# Patient Record
Sex: MALE | Race: WHITE
[De-identification: names, ages, dates, MRNs, and addresses within clinical notes are randomized per-mention and may not be internally consistent; named-entity substitution may affect disease eponyms.]

---

## 2018-09-04 ENCOUNTER — HOSPITAL ENCOUNTER (EMERGENCY)
Dept: HOSPITAL 74 - JER | Age: 48
Discharge: HOME | End: 2018-09-04
Payer: SELF-PAY

## 2018-09-04 VITALS — HEART RATE: 71 BPM | SYSTOLIC BLOOD PRESSURE: 144 MMHG | DIASTOLIC BLOOD PRESSURE: 98 MMHG | TEMPERATURE: 98 F

## 2018-09-04 VITALS — BODY MASS INDEX: 40.1 KG/M2

## 2018-09-04 DIAGNOSIS — I10: ICD-10-CM

## 2018-09-04 DIAGNOSIS — R51: Primary | ICD-10-CM

## 2018-09-04 LAB
ALBUMIN SERPL-MCNC: 4.7 G/DL (ref 3.4–5)
ALP SERPL-CCNC: 68 U/L (ref 45–117)
ALT SERPL-CCNC: 45 U/L (ref 12–78)
ANION GAP SERPL CALC-SCNC: 10 MMOL/L (ref 8–16)
AST SERPL-CCNC: 18 U/L (ref 15–37)
BASOPHILS # BLD: 0.6 % (ref 0–2)
BILIRUB SERPL-MCNC: 0.4 MG/DL (ref 0.2–1)
BUN SERPL-MCNC: 13 MG/DL (ref 7–18)
CALCIUM SERPL-MCNC: 9.6 MG/DL (ref 8.5–10.1)
CHLORIDE SERPL-SCNC: 104 MMOL/L (ref 98–107)
CO2 SERPL-SCNC: 27 MMOL/L (ref 21–32)
CREAT SERPL-MCNC: 0.8 MG/DL (ref 0.7–1.3)
DEPRECATED RDW RBC AUTO: 14.4 % (ref 11.9–15.9)
EOSINOPHIL # BLD: 0.7 % (ref 0–4.5)
GLUCOSE SERPL-MCNC: 81 MG/DL (ref 74–106)
HCT VFR BLD CALC: 42.7 % (ref 35.4–49)
HGB BLD-MCNC: 14.4 GM/DL (ref 11.7–16.9)
LYMPHOCYTES # BLD: 20 % (ref 8–40)
MCH RBC QN AUTO: 30.1 PG (ref 25.7–33.7)
MCHC RBC AUTO-ENTMCNC: 33.8 G/DL (ref 32–35.9)
MCV RBC: 89 FL (ref 80–96)
MONOCYTES # BLD AUTO: 6.3 % (ref 3.8–10.2)
NEUTROPHILS # BLD: 72.4 % (ref 42.8–82.8)
PLATELET # BLD AUTO: 295 K/MM3 (ref 134–434)
PMV BLD: 8.4 FL (ref 7.5–11.1)
POTASSIUM SERPLBLD-SCNC: 4.3 MMOL/L (ref 3.5–5.1)
PROT SERPL-MCNC: 7.9 G/DL (ref 6.4–8.2)
RBC # BLD AUTO: 4.8 M/MM3 (ref 4–5.6)
SODIUM SERPL-SCNC: 141 MMOL/L (ref 136–145)
WBC # BLD AUTO: 10.5 K/MM3 (ref 4–10)

## 2018-09-04 PROCEDURE — 3E033GC INTRODUCTION OF OTHER THERAPEUTIC SUBSTANCE INTO PERIPHERAL VEIN, PERCUTANEOUS APPROACH: ICD-10-PCS

## 2018-09-04 PROCEDURE — 3E033NZ INTRODUCTION OF ANALGESICS, HYPNOTICS, SEDATIVES INTO PERIPHERAL VEIN, PERCUTANEOUS APPROACH: ICD-10-PCS

## 2018-09-04 NOTE — PDOC
Attending Attestation





- Resident


Resident Name: Raphael Moreno





- ED Attending Attestation


I have performed the following: I have examined & evaluated the patient, The 

case was reviewed & discussed with the resident, I agree w/resident's findings 

& plan, Exceptions are as noted





- HPI


HPI: 





09/04/18 16:18


Mr Pierce is a 47 yo M who presents to the ER with a complaint of headache


He was present in the ER bedside to his friend


Patient states that he has had a headache for the past few days


He awoke with a headache several days ago


Described as tight, bandlike


(+) nausea no  vomiting


No head trauma





- Physicial Exam


PE: 





09/04/18 16:19


GENERAL: The patient is in no acute distress.


EYES: PERRLA, EOMI, sclera anicteric, conjunctiva clear.


NECK: Normal range of motion, supple, no nuchal rigidity


LUNGS: Breath sounds equal, clear to auscultation bilaterally.  No wheezes, and 

no crackles.


HEART:Regular rate and rhythm, normal S1 and S2 without murmur, rub or gallop.


ABDOMEN: Soft, nontender  


EXTREMITIES: Normal range of motion


NEUROLOGICAL: Cranial nerves II through XII grossly intact.  Normal speech.  No 

focal neurological deficits. 


MUSCULOSKELETAL:  Back non-tender to palpation, no CVA tenderness


SKIN: Warm, Dry, normal turgor, no rashes or lesions noted.





- Medical Decision Making





09/04/18 16:24


47 yo M presenting to the ER with a complaint of headache


He awoke with these symptoms


Pt in neurologically intact


will 





check labs


CT head


Tylenol, Reglan, Benadryl, IVF


Re Assess


Signed out to Dr Frazier

## 2018-09-04 NOTE — PDOC
History of Present Illness





- General


Chief Complaint: Blood Pressure Problem


Stated Complaint: HEADACHE, HIGH BP


Time Seen by Provider: 09/04/18 15:11





- History of Present Illness


Initial Comments: 





09/04/18 17:16


The patient is a 48 year old male with a history of HTN who presents for 

evaluation of headache.  The patient reports a 5 day history of a diffuse 

poorly described headache that has not responded to tylenol, aleive, exedrine 

prompting his presentation to the ED for further evaluation.  He noted that his 

blood pressure was a little elevated to day as well despite taking his home 

lisinopril.  He otherwise denies fevers, chills, SOB, chest pain, nausea, 

vomiting, abdominal pain, numbness, tingling, weakness, or changes with 

urination or bowel movements.





Past History





- Past Medical History


Allergies/Adverse Reactions: 


 Allergies











Allergy/AdvReac Type Severity Reaction Status Date / Time


 


No Known Allergies Allergy   Verified 09/04/18 13:13











Home Medications: 


Ambulatory Orders





Dextroamphetamine/Amphetamine [Adderall Xr 30 mg Capsule] 30 mg PO DAILY 09/04/ 18 


Lisinopril [Prinivil] 20 mg PO DAILY 09/04/18 


Vortioxetine Hydrobromide [Trintellix] 20 mg PO DAILY 09/04/18 








COPD: No





- Suicide/Smoking/Psychosocial Hx


Smoking History: Never smoked


Have you smoked in the past 12 months: No


Information on smoking cessation initiated: No


Hx Alcohol Use: No


Drug/Substance Use Hx: No


Substance Use Type: None





**Review of Systems





- Review of Systems


Comments:: 





09/04/18 20:12


Constitutional: No fevers, chills, fatigue, malaise


HEENT: No Rhinorrhea, nasal congestion, visual changes


Cardiovascular: No chest pain, syncope, palpitations, lightheadedness


Respiratory: No Cough, SOB, Hemoptysis,


Gastrointestinal: No Abdominal pain, Nausea, Vomiting, Constipation, Diarrhea, 

Melena


Genitourinary: No Dysuria, Frequency, Urgency, Hesitancy, Hematuria, Flank pain


Musculoskeletal: No Myalgia, arthralgia


Skin: No rashes, itching, bruising, pallor


Neurologic: Headache.  No Dizziness, Numbness, Weakness, or Tingling


Psychiatric: No Hallucinations. No SI or HI








*Physical Exam





- Vital Signs


 Last Vital Signs











Temp Pulse Resp BP Pulse Ox


 


 98.0 F   71   16   144/98   100 


 


 09/04/18 13:12  09/04/18 13:12  09/04/18 13:12  09/04/18 13:12  09/04/18 13:12














- Physical Exam


Comments: 





09/04/18 20:12


General Appearance: Nourished. No Apparent Distress


HEENT: EOMI, CARLOS. No Pharyngeal Erythema, Tonsillar Exudate, Tonsillar Erythema


Neck: No Cervical Lymphadenopathy


Respiratory/Chest: Lungs Clear, Normal Breath Sounds. No Crackles, Rales, 

Rhonchi, Wheezing


Cardiovascular: Regular Rhythm, Regular Rate. No Murmur, Gallops, Rubs


Gastrointestinal/Abdominal: Normal Bowel Sounds, Soft. No Guarding, Rebound, 

Tenderness


Musculoskeletal: No CVA Tenderness


Extremity: Normal Capillary Refill


Integumentary: Normal Color, Dry, Warm


Neurologic: CNs II-XII NML intact, Fully Oriented, Alert, Normal Mood/Affect, 

Normal Response, Motor Strength 5/5. Normal Finger to Nose and Heel to Meyers








ED Treatment Course





- LABORATORY


CBC & Chemistry Diagram: 


 09/04/18 15:30





 09/04/18 15:30





Medical Decision Making





- Medical Decision Making





09/04/18 20:13


The patient is a 48 year old male with a history of HTN who presents for 

evaluation of headache.  Differential includes but is not limited to: Infectious

, Intracranial process, migraine, metabolic derangement.  Given the patient's 

history and physical exam, we obtained a cbc, cmp, head ct.  CBC, cmp were 

unremarkable. Head CT is unremarkable as read by our radiologist.  We treated 

the patient with iv fluids, benadryl, reglan, iv tylenol and the patient 

reported mild improvement in his symptoms.  We are comfortable discharging the 

patient home with neurology follow up.  We discussed the results, plan, and 

return precautions with the patient who voiced understanding and is agreeable 

with the plan.





*DC/Admit/Observation/Transfer


Diagnosis at time of Disposition: 


Headache


Qualifiers:


 Headache type: unspecified Headache chronicity pattern: unspecified pattern 

Intractability: not intractable Qualified Code(s): R51 - Headache








- Discharge Dispostion


Disposition: HOME


Condition at time of disposition: Stable





- Referrals


Referrals: 


Halima Iyer MD [Primary Care Provider] - 


Jefry Franklin MD [Staff Physician] - 





- Patient Instructions


Printed Discharge Instructions:  DI for High Blood Pressure, DI for Headache


Additional Instructions: 


Please return to the ER if you experience concerning or worsening symptoms 

including any worsening headache or weakness.





Your lab results and head ct were normal here in the ER.  Please call to 

schedule a follow up appointment with our neurologist Dr. Franklin within 2-3 

days to discuss your ER visit and further management of your symptoms.





- Post Discharge Activity

## 2019-08-01 ENCOUNTER — HOSPITAL ENCOUNTER (EMERGENCY)
Dept: HOSPITAL 74 - JER | Age: 49
Discharge: HOME | End: 2019-08-01
Payer: COMMERCIAL

## 2019-08-01 VITALS — HEART RATE: 66 BPM | SYSTOLIC BLOOD PRESSURE: 145 MMHG | DIASTOLIC BLOOD PRESSURE: 92 MMHG

## 2019-08-01 VITALS — BODY MASS INDEX: 39.5 KG/M2

## 2019-08-01 VITALS — TEMPERATURE: 98.2 F

## 2019-08-01 DIAGNOSIS — I10: Primary | ICD-10-CM

## 2019-08-01 LAB
ALBUMIN SERPL-MCNC: 3.8 G/DL (ref 3.4–5)
ALP SERPL-CCNC: 56 U/L (ref 45–117)
ALT SERPL-CCNC: 47 U/L (ref 13–61)
ANION GAP SERPL CALC-SCNC: 6 MMOL/L (ref 8–16)
AST SERPL-CCNC: 36 U/L (ref 15–37)
BASOPHILS # BLD: 0.7 % (ref 0–2)
BILIRUB SERPL-MCNC: 0.3 MG/DL (ref 0.2–1)
BNP SERPL-MCNC: 80.7 PG/ML (ref 5–125)
BUN SERPL-MCNC: 11.2 MG/DL (ref 7–18)
CALCIUM SERPL-MCNC: 9.4 MG/DL (ref 8.5–10.1)
CHLORIDE SERPL-SCNC: 104 MMOL/L (ref 98–107)
CO2 SERPL-SCNC: 29 MMOL/L (ref 21–32)
CREAT SERPL-MCNC: 1 MG/DL (ref 0.55–1.3)
DEPRECATED RDW RBC AUTO: 14.6 % (ref 11.9–15.9)
EOSINOPHIL # BLD: 1.5 % (ref 0–4.5)
GLUCOSE SERPL-MCNC: 103 MG/DL (ref 74–106)
HCT VFR BLD CALC: 43.8 % (ref 35.4–49)
HGB BLD-MCNC: 15 GM/DL (ref 11.7–16.9)
INR BLD: 0.88 (ref 0.83–1.09)
LYMPHOCYTES # BLD: 22.1 % (ref 8–40)
MCH RBC QN AUTO: 30.9 PG (ref 25.7–33.7)
MCHC RBC AUTO-ENTMCNC: 34.2 G/DL (ref 32–35.9)
MCV RBC: 90.3 FL (ref 80–96)
MONOCYTES # BLD AUTO: 10 % (ref 3.8–10.2)
NEUTROPHILS # BLD: 65.7 % (ref 42.8–82.8)
PLATELET # BLD AUTO: 264 K/MM3 (ref 134–434)
PMV BLD: 7.9 FL (ref 7.5–11.1)
POTASSIUM SERPLBLD-SCNC: 4.5 MMOL/L (ref 3.5–5.1)
PROT SERPL-MCNC: 6.8 G/DL (ref 6.4–8.2)
PT PNL PPP: 10.4 SEC (ref 9.7–13)
RBC # BLD AUTO: 4.85 M/MM3 (ref 4–5.6)
SODIUM SERPL-SCNC: 139 MMOL/L (ref 136–145)
WBC # BLD AUTO: 10.2 K/MM3 (ref 4–10)

## 2019-08-01 PROCEDURE — 3E033GC INTRODUCTION OF OTHER THERAPEUTIC SUBSTANCE INTO PERIPHERAL VEIN, PERCUTANEOUS APPROACH: ICD-10-PCS

## 2019-08-01 PROCEDURE — 3E033NZ INTRODUCTION OF ANALGESICS, HYPNOTICS, SEDATIVES INTO PERIPHERAL VEIN, PERCUTANEOUS APPROACH: ICD-10-PCS

## 2019-08-01 NOTE — PDOC
Rapid Medical Evaluation


Time Seen by Provider: 08/01/19 18:23


Medical Evaluation: 


 Allergies











Allergy/AdvReac Type Severity Reaction Status Date / Time


 


No Known Allergies Allergy   Verified 09/04/18 13:13











08/01/19 18:23


I have performed a brief in-person evaluation of this patient.





The patient presents with a chief complaint of: 50 yo M w/ a h/o HTN sent by 

PMD for high blood pressure. Pt was having a headache, checked his BP which was 

high 183/125, he called his PMD who told him to come to the ED.


Pt took clonidine 0.2mg at around 6 pm.


Now c/o mild 2/10 HA on the top of his head. NO other complaints





Pertinent physical exam findings: Pt with anxious affect.





The patient will proceed to the ED for further evaluation.

## 2019-08-01 NOTE — PDOC
Documentation entered by Stevie Stark SCRIBE, acting as scribe for Tammi Nicole DO.








Tammi Nicole DO:  This documentation has been prepared by the Mi ya Xhesika, SCRIBE, under my direction and personally reviewed by me in its 

entirety.  I confirm that the documentation accurately reflects all work, 

treatment, procedures, and medical decision making performed by me.  





History of Present Illness





- General


Chief Complaint: Blood Pressure Problem


Stated Complaint: SENT BY DOCTOR


Time Seen by Provider: 08/01/19 18:23


History Source: Patient


Exam Limitations: No Limitations





- History of Present Illness


Initial Comments: 





08/01/19 19:36


The patient is a 49 year old male with a significant PMH of  HTN who presents 

to the emergency department  for HTN and headache. The patient states he 

endorsed a headache, checked his BP and it was 175/118 at 1pm and shortly after 

it was 185/125. Patient states he took 2 Clonidine at 6pm with no relief.  

Patient States he then called his PCP and was advised to come to the ED for 

further evaluation. Patient notes his BP at baseline is 140/90. Patient denies 

any changes in vision. Denies numbness or tingling in arms. 





The patient denies chest pain, shortness of breath, and dizziness. Denies fever

, chills, cough, nausea, vomiting, diarrhea and constipation. Denies dysuria, 

frequency, urgency and hematuria.





Allergies: Morphine 


PCP: Halima Lynn








Past History





- Past Medical History


Allergies/Adverse Reactions: 


 Allergies











Allergy/AdvReac Type Severity Reaction Status Date / Time


 


morphine AdvReac   Verified 08/01/19 18:23











Home Medications: 


Ambulatory Orders





Dextroamphetamine/Amphetamine [Adderall Xr 30 mg Capsule] 30 mg PO DAILY 09/04/ 18 


Lisinopril [Prinivil] 20 mg PO DAILY 09/04/18 


Vortioxetine Hydrobromide [Trintellix] 20 mg PO DAILY 09/04/18 








COPD: No


HTN: Yes





- Immunization History


Immunization Up to Date: Yes





- Suicide/Smoking/Psychosocial Hx


Smoking History: Never smoked


Have you smoked in the past 12 months: No


Hx Alcohol Use: No


Drug/Substance Use Hx: No


Substance Use Type: None





**Review of Systems





- Review of Systems


Able to Perform ROS?: Yes


Comments:: 





08/01/19 19:37





GENERAL/CONSTITUTIONAL: No fever or chills. No weakness.


HEAD, EYES, EARS, NOSE AND THROAT: No change in vision. No ear pain or 

discharge. No sore throat.


CARDIOVASCULAR: (+) HTN. No chest pain or shortness of breath.


RESPIRATORY: No cough, wheezing, or hemoptysis.


GASTROINTESTINAL: No nausea, vomiting, diarrhea or constipation.


GENITOURINARY: No dysuria, frequency, or change in urination.


MUSCULOSKELETAL: No joint or muscle swelling or pain. No neck or back pain.


SKIN: No rash


NEUROLOGIC: (+) headache. No vertigo, loss of consciousness, or change in 

strength/sensation.


ENDOCRINE:No increased thirst. No abnormal weight change.


HEMATOLOGIC/LYMPHATIC: No anemia, easy bleeding, or history of blood clots.


ALLERGIC/IMMUNOLOGIC: No hives or skin allergy.











*Physical Exam





- Vital Signs


 Last Vital Signs











Temp Pulse Resp BP Pulse Ox


 


 98.2 F   74   18   172/113 H  97 


 


 08/01/19 18:23  08/01/19 18:23  08/01/19 18:23  08/01/19 18:23  08/01/19 18:23














- Physical Exam


Comments: 





08/01/19 19:37





GENERAL: Awake, alert, and fully oriented, in no acute distress


HEAD: No signs of trauma


EYES: PERRLA, EOMI, sclera anicteric, conjunctiva clear


ENT: Auricles normal inspection, hearing grossly normal, nares patent, 

oropharynx clear without exudates. Moist mucosa


NECK: Normal ROM, supple, no lymphadenopathy, JVD, or masses


LUNGS: Breath sounds equal, clear to auscultation bilaterally.  No wheezes, and 

no crackles


HEART: Regular rate and rhythm, normal S1 and S2, no murmurs, rubs or gallops


ABDOMEN: Soft, nontender, normoactive bowel sounds.  No guarding, no rebound.  

No masses


EXTREMITIES: Normal range of motion, no edema.  No clubbing or cyanosis. No 

cords, erythema, or tenderness


NEUROLOGICAL: Cranial nerves II through XII grossly intact.  Normal speech, 

normal gait


SKIN: Warm, Dry, normal turgor, no rashes or lesions noted.





**Heart Score/ECG Review





- ECG Intrepretation


Comment:: 





08/01/19 19:40


sinus at 66, nl axis, nl interval, rsr' in v1, qrs 102, no acute st/t wave 

findings





ED Treatment Course





- LABORATORY


CBC & Chemistry Diagram: 


 08/01/19 19:45





 08/01/19 19:45





- RADIOLOGY


Radiology Studies Ordered: 














 Category Date Time Status


 


 HEAD CT WITHOUT CONTRAST [CT] Stat CT Scan  08/01/19 19:10 Ordered


 


 CHEST X-RAY PORTABLE* [RAD] Stat Radiology  08/01/19 19:11 Ordered














Medical Decision Making





- Medical Decision Making





08/01/19 19:41


a/p: 50yo male with hx of htn with elevated bp today


-has ha today but without neuro symptoms/findings


-no cp/sob


-no blurred vision


-no abd pain, no n/v/d


-no changes in diet, compliant with meds and took 2 extra clonidine


-will send labs, head ct, ekg, cxr, ua


-will monitor and reassess


-reglan and tylenol





08/01/19 21:23


head ct  neg


repeat bp improved


labs pending


08/01/19 21:36


trop neg


08/01/19 22:00


cxr clear


08/01/19 22:06


discussed labs and imaging with the patient


pt feeling better


bp improved


stable for dc to home and follow up with PMD. 





*DC/Admit/Observation/Transfer


Diagnosis at time of Disposition: 


 Headache, Uncontrolled hypertension








- Discharge Dispostion


Disposition: HOME


Condition at time of disposition: Stable


Decision to Admit order: No





- Referrals


Referrals: 


Halima Iyer MD [Primary Care Provider] - 





- Patient Instructions


Printed Discharge Instructions:  DI for High Blood Pressure, How to Monitor 

Your Blood Pressure at Home


Additional Instructions: 


Please continue to take all medications as prescribed. Please return to the ED 

with any further concerns or complaints. Please continue to take all blood 

pressure medications as prescribed. Please call your PMD for a repeat blood 

pressure check. Please return to the ED with any further concerns or 

complaints. 





- Post Discharge Activity





- Attestations


Physician Attestion: 





08/01/19 22:10








I, Dr. Tammi Nicole, DO, attest that this document has been prepared under 

my direction and personally reviewed by me in its entirety.   I further attest, 

that it accurately reflects all work, treatment, procedures and medical decision

-making performed by me.

## 2019-08-02 NOTE — EKG
Test Reason : 

Blood Pressure : ***/*** mmHG

Vent. Rate : 066 BPM     Atrial Rate : 066 BPM

   P-R Int : 176 ms          QRS Dur : 102 ms

    QT Int : 386 ms       P-R-T Axes : 023 028 061 degrees

   QTc Int : 404 ms

 

NORMAL SINUS RHYTHM

RSR' OR QR PATTERN IN V1 SUGGESTS RIGHT VENTRICULAR CONDUCTION DELAY

NO PREVIOUS ECGS AVAILABLE

Confirmed by QUINN BRUMFIELD MD ([id number]) on 8/2/2019 2:11:17 PM

 

Referred By:             Confirmed By:QUINN BRUMFIELD MD

## 2020-12-13 ENCOUNTER — HOSPITAL ENCOUNTER (EMERGENCY)
Dept: HOSPITAL 74 - JER | Age: 50
Discharge: HOME | End: 2020-12-13
Payer: COMMERCIAL

## 2020-12-13 VITALS — HEART RATE: 83 BPM | SYSTOLIC BLOOD PRESSURE: 152 MMHG | DIASTOLIC BLOOD PRESSURE: 109 MMHG

## 2020-12-13 VITALS — TEMPERATURE: 98 F

## 2020-12-13 VITALS — BODY MASS INDEX: 33.1 KG/M2

## 2020-12-13 DIAGNOSIS — I10: Primary | ICD-10-CM

## 2020-12-13 LAB
ALBUMIN SERPL-MCNC: 4 G/DL (ref 3.4–5)
ALP SERPL-CCNC: 61 U/L (ref 45–117)
ALT SERPL-CCNC: 39 U/L (ref 13–61)
ANION GAP SERPL CALC-SCNC: 7 MMOL/L (ref 8–16)
APPEARANCE UR: CLEAR
AST SERPL-CCNC: 27 U/L (ref 15–37)
BACTERIA # UR AUTO: 10 /UL (ref 0–1359)
BASOPHILS # BLD: 0.5 % (ref 0–2)
BILIRUB SERPL-MCNC: 0.3 MG/DL (ref 0.2–1)
BILIRUB UR STRIP.AUTO-MCNC: NEGATIVE MG/DL
BUN SERPL-MCNC: 14.2 MG/DL (ref 7–18)
CALCIUM SERPL-MCNC: 9.5 MG/DL (ref 8.5–10.1)
CASTS URNS QL MICRO: 0 /UL (ref 0–3.1)
CHLORIDE SERPL-SCNC: 104 MMOL/L (ref 98–107)
CO2 SERPL-SCNC: 31 MMOL/L (ref 21–32)
COLOR UR: YELLOW
CREAT SERPL-MCNC: 1.2 MG/DL (ref 0.55–1.3)
DEPRECATED RDW RBC AUTO: 13.8 % (ref 11.9–15.9)
EOSINOPHIL # BLD: 0.9 % (ref 0–4.5)
EPITH CASTS URNS QL MICRO: 3 /UL (ref 0–25.1)
GLUCOSE SERPL-MCNC: 88 MG/DL (ref 74–106)
HCT VFR BLD CALC: 40.9 % (ref 35.4–49)
HGB BLD-MCNC: 13.7 GM/DL (ref 11.7–16.9)
KETONES UR QL STRIP: NEGATIVE
LEUKOCYTE ESTERASE UR QL STRIP.AUTO: NEGATIVE
LYMPHOCYTES # BLD: 11.4 % (ref 8–40)
MCH RBC QN AUTO: 30.7 PG (ref 25.7–33.7)
MCHC RBC AUTO-ENTMCNC: 33.4 G/DL (ref 32–35.9)
MCV RBC: 91.8 FL (ref 80–96)
MONOCYTES # BLD AUTO: 7.3 % (ref 3.8–10.2)
NEUTROPHILS # BLD: 79.9 % (ref 42.8–82.8)
NITRITE UR QL STRIP: NEGATIVE
PH UR: 6 [PH] (ref 5–8)
PLATELET # BLD AUTO: 250 K/MM3 (ref 134–434)
PMV BLD: 7.5 FL (ref 7.5–11.1)
POTASSIUM SERPLBLD-SCNC: 4.6 MMOL/L (ref 3.5–5.1)
PROT SERPL-MCNC: 6.9 G/DL (ref 6.4–8.2)
PROT UR QL STRIP: NEGATIVE
PROT UR QL STRIP: NEGATIVE
RBC # BLD AUTO: 4.45 M/MM3 (ref 4–5.6)
RBC # BLD AUTO: 51 /UL (ref 0–23.9)
SODIUM SERPL-SCNC: 141 MMOL/L (ref 136–145)
SP GR UR: 1.01 (ref 1.01–1.03)
UROBILINOGEN UR STRIP-MCNC: 0.2 MG/DL (ref 0.2–1)
WBC # BLD AUTO: 7 K/MM3 (ref 4–10)
WBC # UR AUTO: 6 /UL (ref 0–25.8)

## 2020-12-13 PROCEDURE — 3E0333Z INTRODUCTION OF ANTI-INFLAMMATORY INTO PERIPHERAL VEIN, PERCUTANEOUS APPROACH: ICD-10-PCS

## 2020-12-13 PROCEDURE — 3E033GC INTRODUCTION OF OTHER THERAPEUTIC SUBSTANCE INTO PERIPHERAL VEIN, PERCUTANEOUS APPROACH: ICD-10-PCS

## 2021-09-21 ENCOUNTER — HOSPITAL ENCOUNTER (INPATIENT)
Dept: HOSPITAL 74 - JER | Age: 51
LOS: 2 days | Discharge: HOME | DRG: 683 | End: 2021-09-23
Attending: INTERNAL MEDICINE | Admitting: INTERNAL MEDICINE
Payer: COMMERCIAL

## 2021-09-21 VITALS — BODY MASS INDEX: 41.2 KG/M2

## 2021-09-21 DIAGNOSIS — R11.0: ICD-10-CM

## 2021-09-21 DIAGNOSIS — M62.81: ICD-10-CM

## 2021-09-21 DIAGNOSIS — F41.9: ICD-10-CM

## 2021-09-21 DIAGNOSIS — E66.01: ICD-10-CM

## 2021-09-21 DIAGNOSIS — K21.9: ICD-10-CM

## 2021-09-21 DIAGNOSIS — D72.829: ICD-10-CM

## 2021-09-21 DIAGNOSIS — G25.1: ICD-10-CM

## 2021-09-21 DIAGNOSIS — I10: ICD-10-CM

## 2021-09-21 DIAGNOSIS — N17.9: Primary | ICD-10-CM

## 2021-09-21 LAB
ALBUMIN SERPL-MCNC: 4.7 G/DL (ref 3.4–5)
ALP SERPL-CCNC: 83 U/L (ref 45–117)
ALT SERPL-CCNC: 45 U/L (ref 13–61)
ANION GAP SERPL CALC-SCNC: 6 MMOL/L (ref 8–16)
APPEARANCE UR: CLEAR
APTT BLD: 31.5 SECONDS (ref 25.2–36.5)
AST SERPL-CCNC: 29 U/L (ref 15–37)
BASE EXCESS BLDV CALC-SCNC: -4.8 MMOL/L (ref -2–2)
BASOPHILS # BLD: 0.4 % (ref 0–2)
BILIRUB SERPL-MCNC: 0.4 MG/DL (ref 0.2–1)
BILIRUB UR STRIP.AUTO-MCNC: NEGATIVE MG/DL
BUN SERPL-MCNC: 28.6 MG/DL (ref 7–18)
CALCIUM SERPL-MCNC: 9.7 MG/DL (ref 8.5–10.1)
CHLORIDE SERPL-SCNC: 105 MMOL/L (ref 98–107)
CO2 SERPL-SCNC: 25 MMOL/L (ref 21–32)
COLOR UR: YELLOW
CREAT SERPL-MCNC: 1.8 MG/DL (ref 0.55–1.3)
DEPRECATED RDW RBC AUTO: 14.3 % (ref 11.9–15.9)
EOSINOPHIL # BLD: 0.4 % (ref 0–4.5)
GLUCOSE SERPL-MCNC: 110 MG/DL (ref 74–106)
HCT VFR BLD CALC: 35 % (ref 35.4–49)
HCT VFR BLDV CALC: 38 % (ref 35.4–49)
HGB BLD-MCNC: 12 GM/DL (ref 11.7–16.9)
INR BLD: 0.9 (ref 0.83–1.09)
KETONES UR QL STRIP: NEGATIVE
LEUKOCYTE ESTERASE UR QL STRIP.AUTO: NEGATIVE
LIPASE SERPL-CCNC: 149 U/L (ref 73–393)
LYMPHOCYTES # BLD: 7.7 % (ref 8–40)
MAGNESIUM SERPL-MCNC: 2.1 MG/DL (ref 1.8–2.4)
MCH RBC QN AUTO: 31.9 PG (ref 25.7–33.7)
MCHC RBC AUTO-ENTMCNC: 34.3 G/DL (ref 32–35.9)
MCV RBC: 92.8 FL (ref 80–96)
MONOCYTES # BLD AUTO: 6.3 % (ref 3.8–10.2)
NEUTROPHILS # BLD: 85.2 % (ref 42.8–82.8)
NITRITE UR QL STRIP: NEGATIVE
PCO2 BLDV: 51.4 MMHG (ref 38–52)
PH BLDV: 7.26 [PH] (ref 7.31–7.41)
PH UR: 5 [PH] (ref 5–8)
PLATELET # BLD AUTO: 272 10^3/UL (ref 134–434)
PMV BLD: 7.3 FL (ref 7.5–11.1)
PROT SERPL-MCNC: 7.9 G/DL (ref 6.4–8.2)
PROT UR QL STRIP: (no result)
PROT UR QL STRIP: NEGATIVE
PT PNL PPP: 11 SEC (ref 9.7–13)
RBC # BLD AUTO: 3.77 M/MM3 (ref 4–5.6)
SAO2 % BLDV: 55.9 % (ref 70–80)
SODIUM SERPL-SCNC: 136 MMOL/L (ref 136–145)
SP GR UR: 1.01 (ref 1.01–1.03)
UROBILINOGEN UR STRIP-MCNC: 0.2 MG/DL (ref 0.2–1)
WBC # BLD AUTO: 12.2 K/MM3 (ref 4–10)

## 2021-09-21 PROCEDURE — U0003 INFECTIOUS AGENT DETECTION BY NUCLEIC ACID (DNA OR RNA); SEVERE ACUTE RESPIRATORY SYNDROME CORONAVIRUS 2 (SARS-COV-2) (CORONAVIRUS DISEASE [COVID-19]), AMPLIFIED PROBE TECHNIQUE, MAKING USE OF HIGH THROUGHPUT TECHNOLOGIES AS DESCRIBED BY CMS-2020-01-R: HCPCS

## 2021-09-21 PROCEDURE — C9803 HOPD COVID-19 SPEC COLLECT: HCPCS

## 2021-09-21 PROCEDURE — U0005 INFEC AGEN DETEC AMPLI PROBE: HCPCS

## 2021-09-22 LAB
ANION GAP SERPL CALC-SCNC: 6 MMOL/L (ref 8–16)
BASOPHILS # BLD: 0.2 % (ref 0–2)
BUN SERPL-MCNC: 22.3 MG/DL (ref 7–18)
CALCIUM SERPL-MCNC: 10 MG/DL (ref 8.5–10.1)
CHLORIDE SERPL-SCNC: 107 MMOL/L (ref 98–107)
CO2 SERPL-SCNC: 27 MMOL/L (ref 21–32)
CREAT SERPL-MCNC: 1.4 MG/DL (ref 0.55–1.3)
DEPRECATED RDW RBC AUTO: 14.5 % (ref 11.9–15.9)
EOSINOPHIL # BLD: 0.1 % (ref 0–4.5)
GLUCOSE SERPL-MCNC: 98 MG/DL (ref 74–106)
HCT VFR BLD CALC: 34.4 % (ref 35.4–49)
HGB BLD-MCNC: 11.8 GM/DL (ref 11.7–16.9)
LYMPHOCYTES # BLD: 9.1 % (ref 8–40)
MCH RBC QN AUTO: 32.3 PG (ref 25.7–33.7)
MCHC RBC AUTO-ENTMCNC: 34.3 G/DL (ref 32–35.9)
MCV RBC: 94.1 FL (ref 80–96)
MONOCYTES # BLD AUTO: 5.1 % (ref 3.8–10.2)
NEUTROPHILS # BLD: 85.5 % (ref 42.8–82.8)
PLATELET # BLD AUTO: 273 10^3/UL (ref 134–434)
PMV BLD: 7.6 FL (ref 7.5–11.1)
RBC # BLD AUTO: 3.66 M/MM3 (ref 4–5.6)
SODIUM SERPL-SCNC: 140 MMOL/L (ref 136–145)
WBC # BLD AUTO: 7.9 K/MM3 (ref 4–10)

## 2021-09-22 RX ADMIN — ENOXAPARIN SODIUM SCH MG: 40 INJECTION SUBCUTANEOUS at 09:41

## 2021-09-22 RX ADMIN — PANTOPRAZOLE SODIUM SCH MG: 40 INJECTION, POWDER, FOR SOLUTION INTRAVENOUS at 09:42

## 2021-09-22 RX ADMIN — LISINOPRIL SCH MG: 20 TABLET ORAL at 09:42

## 2021-09-22 RX ADMIN — DOCUSATE SODIUM SCH MG: 100 CAPSULE, LIQUID FILLED ORAL at 01:29

## 2021-09-22 RX ADMIN — POTASSIUM CHLORIDE, DEXTROSE MONOHYDRATE AND SODIUM CHLORIDE SCH MLS/HR: 75; 5; 450 INJECTION, SOLUTION INTRAVENOUS at 15:12

## 2021-09-22 RX ADMIN — ENOXAPARIN SODIUM SCH MG: 40 INJECTION SUBCUTANEOUS at 01:30

## 2021-09-22 RX ADMIN — FOLIC ACID SCH MG: 1 TABLET ORAL at 09:41

## 2021-09-22 RX ADMIN — NEBIVOLOL HYDROCHLORIDE SCH MG: 5 TABLET ORAL at 09:41

## 2021-09-22 RX ADMIN — DOCUSATE SODIUM SCH MG: 100 CAPSULE, LIQUID FILLED ORAL at 13:22

## 2021-09-22 RX ADMIN — DOCUSATE SODIUM SCH MG: 100 CAPSULE, LIQUID FILLED ORAL at 07:04

## 2021-09-22 RX ADMIN — DOCUSATE SODIUM SCH MG: 100 CAPSULE, LIQUID FILLED ORAL at 21:24

## 2021-09-23 VITALS — TEMPERATURE: 98.2 F | SYSTOLIC BLOOD PRESSURE: 105 MMHG | HEART RATE: 72 BPM | DIASTOLIC BLOOD PRESSURE: 64 MMHG

## 2021-09-23 LAB
ANION GAP SERPL CALC-SCNC: 5 MMOL/L (ref 8–16)
BASOPHILS # BLD: 0.7 % (ref 0–2)
BUN SERPL-MCNC: 22.4 MG/DL (ref 7–18)
CALCIUM SERPL-MCNC: 9.3 MG/DL (ref 8.5–10.1)
CHLORIDE SERPL-SCNC: 107 MMOL/L (ref 98–107)
CO2 SERPL-SCNC: 27 MMOL/L (ref 21–32)
CREAT SERPL-MCNC: 1.2 MG/DL (ref 0.55–1.3)
DEPRECATED RDW RBC AUTO: 14.4 % (ref 11.9–15.9)
EOSINOPHIL # BLD: 1.8 % (ref 0–4.5)
GLUCOSE SERPL-MCNC: 97 MG/DL (ref 74–106)
HCT VFR BLD CALC: 30.7 % (ref 35.4–49)
HGB BLD-MCNC: 10.8 GM/DL (ref 11.7–16.9)
LYMPHOCYTES # BLD: 20.9 % (ref 8–40)
MCH RBC QN AUTO: 32.2 PG (ref 25.7–33.7)
MCHC RBC AUTO-ENTMCNC: 35.2 G/DL (ref 32–35.9)
MCV RBC: 91.7 FL (ref 80–96)
MONOCYTES # BLD AUTO: 9.1 % (ref 3.8–10.2)
NEUTROPHILS # BLD: 67.5 % (ref 42.8–82.8)
PLATELET # BLD AUTO: 255 10^3/UL (ref 134–434)
PMV BLD: 7.4 FL (ref 7.5–11.1)
RBC # BLD AUTO: 3.35 M/MM3 (ref 4–5.6)
SODIUM SERPL-SCNC: 139 MMOL/L (ref 136–145)
WBC # BLD AUTO: 7.6 K/MM3 (ref 4–10)

## 2021-09-23 RX ADMIN — FOLIC ACID SCH MG: 1 TABLET ORAL at 10:15

## 2021-09-23 RX ADMIN — NEBIVOLOL HYDROCHLORIDE SCH MG: 5 TABLET ORAL at 10:15

## 2021-09-23 RX ADMIN — ENOXAPARIN SODIUM SCH: 40 INJECTION SUBCUTANEOUS at 10:15

## 2021-09-23 RX ADMIN — DOCUSATE SODIUM SCH MG: 100 CAPSULE, LIQUID FILLED ORAL at 06:43

## 2021-09-23 RX ADMIN — PANTOPRAZOLE SODIUM SCH MG: 40 INJECTION, POWDER, FOR SOLUTION INTRAVENOUS at 10:15

## 2021-09-23 RX ADMIN — LISINOPRIL SCH MG: 20 TABLET ORAL at 10:14

## 2021-09-23 RX ADMIN — POTASSIUM CHLORIDE, DEXTROSE MONOHYDRATE AND SODIUM CHLORIDE SCH: 75; 5; 450 INJECTION, SOLUTION INTRAVENOUS at 11:53
